# Patient Record
Sex: FEMALE | Race: AMERICAN INDIAN OR ALASKA NATIVE
[De-identification: names, ages, dates, MRNs, and addresses within clinical notes are randomized per-mention and may not be internally consistent; named-entity substitution may affect disease eponyms.]

---

## 2017-04-27 ENCOUNTER — HOSPITAL ENCOUNTER (EMERGENCY)
Dept: HOSPITAL 5 - ED | Age: 37
LOS: 1 days | Discharge: HOME | End: 2017-04-28
Payer: MEDICAID

## 2017-04-27 DIAGNOSIS — I10: ICD-10-CM

## 2017-04-27 DIAGNOSIS — J98.01: Primary | ICD-10-CM

## 2017-04-27 LAB
ANION GAP SERPL CALC-SCNC: 16 MMOL/L
BASOPHILS NFR BLD AUTO: 0.4 % (ref 0–1.8)
BUN SERPL-MCNC: 11 MG/DL (ref 7–17)
BUN/CREAT SERPL: 12.22 %
CALCIUM SERPL-MCNC: 8.7 MG/DL (ref 8.4–10.2)
CHLORIDE SERPL-SCNC: 103 MMOL/L (ref 98–107)
CK SERPL-CCNC: 61 UNITS/L (ref 30–135)
CO2 SERPL-SCNC: 25 MMOL/L (ref 22–30)
EOSINOPHIL NFR BLD AUTO: 1.7 % (ref 0–4.3)
GLUCOSE SERPL-MCNC: 102 MG/DL (ref 65–100)
HCT VFR BLD CALC: 30.4 % (ref 30.3–42.9)
HGB BLD-MCNC: 9.5 GM/DL (ref 10.1–14.3)
MCH RBC QN AUTO: 22 PG (ref 28–32)
MCHC RBC AUTO-ENTMCNC: 31 % (ref 30–34)
MCV RBC AUTO: 71 FL (ref 79–97)
PLATELET # BLD: 247 K/MM3 (ref 140–440)
POTASSIUM SERPL-SCNC: 4.1 MMOL/L (ref 3.6–5)
RBC # BLD AUTO: 4.26 M/MM3 (ref 3.65–5.03)
SODIUM SERPL-SCNC: 140 MMOL/L (ref 137–145)
WBC # BLD AUTO: 9.1 K/MM3 (ref 4.5–11)

## 2017-04-27 PROCEDURE — 93010 ELECTROCARDIOGRAM REPORT: CPT

## 2017-04-27 PROCEDURE — 85379 FIBRIN DEGRADATION QUANT: CPT

## 2017-04-27 PROCEDURE — 85025 COMPLETE CBC W/AUTO DIFF WBC: CPT

## 2017-04-27 PROCEDURE — 96374 THER/PROPH/DIAG INJ IV PUSH: CPT

## 2017-04-27 PROCEDURE — 99285 EMERGENCY DEPT VISIT HI MDM: CPT

## 2017-04-27 PROCEDURE — 80048 BASIC METABOLIC PNL TOTAL CA: CPT

## 2017-04-27 PROCEDURE — 96375 TX/PRO/DX INJ NEW DRUG ADDON: CPT

## 2017-04-27 PROCEDURE — 94640 AIRWAY INHALATION TREATMENT: CPT

## 2017-04-27 PROCEDURE — 82550 ASSAY OF CK (CPK): CPT

## 2017-04-27 PROCEDURE — 84484 ASSAY OF TROPONIN QUANT: CPT

## 2017-04-27 PROCEDURE — 82553 CREATINE MB FRACTION: CPT

## 2017-04-27 PROCEDURE — 71010: CPT

## 2017-04-27 PROCEDURE — 36415 COLL VENOUS BLD VENIPUNCTURE: CPT

## 2017-04-27 PROCEDURE — 83880 ASSAY OF NATRIURETIC PEPTIDE: CPT

## 2017-04-27 PROCEDURE — 93005 ELECTROCARDIOGRAM TRACING: CPT

## 2017-04-27 NOTE — EMERGENCY DEPARTMENT REPORT
ED Chest Pain HPI





- General


Chief Complaint: Chest Pain


Stated Complaint: CHEST PAIN


Time Seen by Provider: 04/27/17 17:28


Source: patient, EMS


Mode of arrival: Stretcher


Limitations: No Limitations





- History of Present Illness


Initial Comments: 


This is a 36-year-old -American female presents to the emergency 

department from home by EMS with complaint of midsternal chest tightness and a 

cough that began about 1:30 PM.  Patient says she went to bed last night 

feeling fine and awoke with a slightly sore throat and then the rest the 

symptoms began in the afternoon.  She did not take anything for symptoms prior 

to presentation.  She denies any fever, nausea, vomiting, diaphoresis or any 

shortness of breath.  She has a history of hypertension for which she takes 

hydrochlorothiazide.  Her primary care doctor is a family physician in [place].  

No recent travel or sick contacts at home.





Severity scale (0 -10): 6





- Related Data


 Home Medications











 Medication  Instructions  Recorded  Confirmed  Last Taken


 


Hydrochlorothiazide [Hctz] 12.5 mg PO QDAY 04/27/17 04/27/17 04/27/17








 Previous Rx's











 Medication  Instructions  Recorded  Last Taken  Type


 


ALBUTEROL Inhaler [ProAir HFA 2 puff IH QID PRN #1 inhalation 04/27/17 Unknown 

Rx





Inhaler]    


 


predniSONE [Deltasone] 20 mg PO QDAY #5 tab 04/27/17 Unknown Rx











 Allergies











Allergy/AdvReac Type Severity Reaction Status Date / Time


 


No Known Allergies Allergy   Unverified 04/27/17 17:18














GRISEL score





- Grisel Score


Age > 65: (0) No


Aspirin use within the Past 7 Days: (0) No


3 or more CAD Risk Factors: (0) No


2 or more Angina events in past 24 hrs: (1) Yes


Known CAD with more than 50% Stenosis: (0) No


Elevated Cardiac Markers: (0) No


ST Deviation Greater than 0.5mm: (0) No


GRISEL Score: 1





ED Review of Systems


ROS: 


Stated complaint: CHEST PAIN


Other details as noted in HPI





Comment: All other systems reviewed and negative


Constitutional: denies: chills, fever


Eyes: denies: eye pain, eye discharge, vision change


ENT: denies: ear pain, dental pain


Respiratory: cough.  denies: wheezing


Cardiovascular: chest pain.  denies: edema


Gastrointestinal: denies: abdominal pain, nausea, diarrhea


Genitourinary: denies: urgency, dysuria, discharge


Musculoskeletal: denies: back pain, joint swelling, arthralgia


Skin: denies: rash, lesions


Neurological: denies: headache, weakness, paresthesias





ED Past Medical Hx





- Past Medical History


Previous Medical History?: Yes


Hx Hypertension: Yes





- Social History


Smoking Status: Never Smoker


Substance Use Type: None





- Medications


Home Medications: 


 Home Medications











 Medication  Instructions  Recorded  Confirmed  Last Taken  Type


 


ALBUTEROL Inhaler [ProAir HFA 2 puff IH QID PRN #1 inhalation 04/27/17  Unknown 

Rx





Inhaler]     


 


Hydrochlorothiazide [Hctz] 12.5 mg PO QDAY 04/27/17 04/27/17 04/27/17 History


 


predniSONE [Deltasone] 20 mg PO QDAY #5 tab 04/27/17  Unknown Rx














ED Physical Exam





- General


Limitations: No Limitations





- Other


Other exam information: 


GENERAL: The patient is well-developed well-nourished.


HEENT: Normocephalic.  Atraumatic.  Extraocular motions are intact.  Patient 

has moist mucous membranes.  Pupils equal reactive to light bilaterally.


NECK: Supple.  Trachea is midline.


CHEST/LUNGS: Mild expiratory wheezing.  No tachypnea or accessory muscle use.  

There is no respiratory distress noted.


HEART/CARDIOVASCULAR: Regular.  There is no tachycardia.  There is no gallop 

rub or murmur.


ABDOMEN: Abdomen is soft, nontender.  Patient has normal bowel sounds.  

Morbidly obese habitus.


SKIN: Skin is warm and dry.


NEURO: The patient is awake, alert, and oriented.  The patient is cooperative.  

The patient has no focal neurologic deficits.  The patient has normal speech.


MUSCULOSKELETAL: There is no tenderness or deformity.  There is no limitation 

range of motion.  There is no evidence of acute injury.








ED Course


 Vital Signs











  04/27/17 04/27/17 04/27/17





  16:49 18:26 18:28


 


Temperature 98.6 F  


 


Pulse Rate 67 64 62


 


Respiratory 18 17 15





Rate   


 


Blood Pressure 179/63 179/63 179/63


 


Blood Pressure   





[Left]   


 


O2 Sat by Pulse 100 98 98





Oximetry   














  04/27/17 04/27/17 04/27/17





  18:30 18:32 18:34


 


Temperature   


 


Pulse Rate 61 66 61


 


Respiratory 18 15 12





Rate   


 


Blood Pressure 179/63 179/63 179/63


 


Blood Pressure   





[Left]   


 


O2 Sat by Pulse 99 99 100





Oximetry   














  04/27/17 04/27/17 04/27/17





  18:36 18:38 18:40


 


Temperature   


 


Pulse Rate 61 64 59 L


 


Respiratory 18 13 18





Rate   


 


Blood Pressure 179/63 179/63 179/63


 


Blood Pressure   





[Left]   


 


O2 Sat by Pulse 100 96 98





Oximetry   














  04/27/17 04/27/17 04/27/17





  18:42 18:44 18:46


 


Temperature   


 


Pulse Rate 66 67 61


 


Respiratory 19 12 21





Rate   


 


Blood Pressure 179/63 179/63 179/63


 


Blood Pressure   





[Left]   


 


O2 Sat by Pulse 99  100





Oximetry   














  04/27/17 04/27/17 04/27/17





  18:48 18:50 18:52


 


Temperature   


 


Pulse Rate 57 L 66 66


 


Respiratory 16 21 16





Rate   


 


Blood Pressure 179/63 179/63 179/63


 


Blood Pressure   





[Left]   


 


O2 Sat by Pulse 100 100 99





Oximetry   














  04/27/17 04/27/17 04/27/17





  18:54 18:56 18:58


 


Temperature   99.1 F


 


Pulse Rate 69 65 70


 


Respiratory 17 18 18





Rate   


 


Blood Pressure 172/71 172/71 172/71


 


Blood Pressure   172/71





[Left]   


 


O2 Sat by Pulse 100 99 98





Oximetry   














  04/27/17 04/27/17 04/27/17





  19:00 19:02 19:04


 


Temperature   


 


Pulse Rate 76 65 67


 


Respiratory 21 19 12





Rate   


 


Blood Pressure 172/71 159/75 159/75


 


Blood Pressure   





[Left]   


 


O2 Sat by Pulse 100 100 98





Oximetry   














  04/27/17 04/27/17 04/27/17





  19:06 19:08 19:10


 


Temperature   


 


Pulse Rate 66 71 70


 


Respiratory 16 17 15





Rate   


 


Blood Pressure 159/75 159/75 159/75


 


Blood Pressure   





[Left]   


 


O2 Sat by Pulse 98 99 99





Oximetry   














  04/27/17 04/27/17 04/27/17





  19:12 19:14 19:16


 


Temperature   


 


Pulse Rate 69 66 67


 


Respiratory 17 22 19





Rate   


 


Blood Pressure 159/75 159/75 159/75


 


Blood Pressure   





[Left]   


 


O2 Sat by Pulse 98 100 100





Oximetry   














  04/27/17 04/27/17 04/27/17





  19:18 19:20 19:22


 


Temperature   


 


Pulse Rate 68 66 68


 


Respiratory 21 25 H 14





Rate   


 


Blood Pressure 159/75 159/75 159/75


 


Blood Pressure   





[Left]   


 


O2 Sat by Pulse 100 100 100





Oximetry   














  04/27/17 04/27/17 04/27/17





  19:25 19:27 19:29


 


Temperature   


 


Pulse Rate 66 70 69


 


Respiratory 11 L 15 19





Rate   


 


Blood Pressure 179/63 179/63 179/63


 


Blood Pressure   





[Left]   


 


O2 Sat by Pulse 98 100 99





Oximetry   














  04/27/17 04/27/17 04/27/17





  19:30 19:33 19:35


 


Temperature   


 


Pulse Rate 68 65 71


 


Respiratory 14 13 13





Rate   


 


Blood Pressure 149/92 149/92 159/75


 


Blood Pressure   





[Left]   


 


O2 Sat by Pulse 100 100 100





Oximetry   














  04/27/17 04/27/17 04/27/17





  19:37 19:39 19:56


 


Temperature   


 


Pulse Rate 64 67 66


 


Respiratory 11 L 22 





Rate   


 


Blood Pressure 159/75 159/75 149/92


 


Blood Pressure   





[Left]   


 


O2 Sat by Pulse 100 99 





Oximetry   














  04/27/17 04/27/17





  20:01 20:41


 


Temperature  


 


Pulse Rate 83 


 


Respiratory 24 18





Rate  


 


Blood Pressure 124/82 


 


Blood Pressure  





[Left]  


 


O2 Sat by Pulse 96 





Oximetry  














ED Medical Decision Making





- Lab Data


Result diagrams: 


 04/27/17 17:30





 04/27/17 17:30





- EKG Data


-: EKG Interpreted by Me


EKG shows normal: sinus rhythm, axis (LAD), intervals, QRS complexes (LVH), ST-

T waves


Rate: normal





- EKG Data


When compared to previous EKG there are: previous EKG unavailable


Interpretation: LVH





- Radiology Data


Radiology results: image reviewed


interpreted by me: 


Chest x-ray did not show any acute process.  Heart is normal shape and size.  

No effusions.  No pneumothorax.  No signs of pneumonia seen.








- Medical Decision Making


36-year-old female presents to the emergency department with complaint of some 

nonspecific midsternal chest tightness.  Her only risk factor is hypertension.  

EKG does not show any signs of ST elevation MI or dysrhythmia.  Patient's labs 

include negative troponins 2, negative d-dimer.  There is no leukocytosis, 

electrolyte abnormalities, renal insufficiency.  Patient was given a DuoNeb and 

says that that really helped with her symptoms.  She required another dose of 

albuterol before she started feeling much better.  Her vital signs are stable 

including being afebrile.  Chest x-ray does not show any pneumonia, pleural 

effusion, pneumothorax or any acute process.  Patient has a GRISEL score of one 

of her discomfort is considered angina and 0 if not.  She is very low on the 

heart score.  For all these reasons patient appears safe for discharge home.  

She'll go home with an albuterol inhaler, a few days of steroids and 

encouragement to follow-up with her primary care doctor.  She will return to 

the ER with any worsening of her symptoms or any acute distress.








- Differential Diagnosis


MI, PE, pneumonia, asthma, bronchitis


Critical Care Time: No


Critical care attestation.: 


If time is entered above; I have spent that time in minutes in the direct care 

of this critically ill patient, excluding procedure time.








ED Disposition


Clinical Impression: 


 Chest tightness, Bronchospasm





Hypertension


Qualifiers:


 Hypertension type: essential hypertension Qualified Code(s): I10 - Essential (

primary) hypertension





Disposition: DISCHARGED TO HOME OR SELFCARE


Is pt being admited?: No


Condition: Stable


Instructions:  Chest Pain (ED), Hypertension (ED), Bronchospasm (ED)


Additional Instructions: 


Please follow-up with a primary care doctor in the next few days.  Return to 

the emergency department with any worsening of your symptoms or any acute 

distress.  Please continue taking your hydrochlorothiazide.  Try to stay away 

from foods that are high in salt and caffeinated products to help with her 

blood pressure.


Prescriptions: 


ALBUTEROL Inhaler [ProAir HFA Inhaler] 2 puff IH QID PRN #1 inhalation


 PRN Reason: Shortness Of Breath


predniSONE [Deltasone] 20 mg PO QDAY #5 tab


Referrals: 


PRIMARY CARE,MD [Primary Care Provider] - 3-5 Days


Time of Disposition: 23:23

## 2017-04-28 VITALS — DIASTOLIC BLOOD PRESSURE: 72 MMHG | SYSTOLIC BLOOD PRESSURE: 172 MMHG

## 2017-04-28 NOTE — XRAY REPORT
PORTABLE CHEST:

An AP portable view of the chest demonstrates a normal cardiac

contour considering the limits of this technique.  The lungs are clear 

with no evidence of infiltrate, fluid or failure.



IMPRESSION:

Normal portable chest.

## 2018-02-08 ENCOUNTER — HOSPITAL ENCOUNTER (EMERGENCY)
Dept: HOSPITAL 5 - ED | Age: 38
Discharge: HOME | End: 2018-02-08
Payer: MEDICAID

## 2018-02-08 VITALS — SYSTOLIC BLOOD PRESSURE: 133 MMHG | DIASTOLIC BLOOD PRESSURE: 82 MMHG

## 2018-02-08 DIAGNOSIS — Z3A.01: ICD-10-CM

## 2018-02-08 DIAGNOSIS — O20.9: Primary | ICD-10-CM

## 2018-02-08 DIAGNOSIS — K64.8: ICD-10-CM

## 2018-02-08 LAB
BILIRUB UR QL STRIP: (no result)
BLOOD UR QL VISUAL: (no result)
MUCOUS THREADS #/AREA URNS HPF: (no result) /HPF
NITRITE UR QL STRIP: (no result)
PH UR STRIP: 5 [PH] (ref 5–7)
PROT UR STRIP-MCNC: (no result) MG/DL
RBC #/AREA URNS HPF: 1 /HPF (ref 0–6)
UROBILINOGEN UR-MCNC: < 2 MG/DL (ref ?–2)
WBC #/AREA URNS HPF: 2 /HPF (ref 0–6)

## 2018-02-08 PROCEDURE — 36415 COLL VENOUS BLD VENIPUNCTURE: CPT

## 2018-02-08 PROCEDURE — 81001 URINALYSIS AUTO W/SCOPE: CPT

## 2018-02-08 PROCEDURE — 76817 TRANSVAGINAL US OBSTETRIC: CPT

## 2018-02-08 PROCEDURE — 76801 OB US < 14 WKS SINGLE FETUS: CPT

## 2018-02-08 PROCEDURE — 86901 BLOOD TYPING SEROLOGIC RH(D): CPT

## 2018-02-08 PROCEDURE — 86900 BLOOD TYPING SEROLOGIC ABO: CPT

## 2018-02-08 PROCEDURE — 84702 CHORIONIC GONADOTROPIN TEST: CPT

## 2018-02-08 NOTE — EMERGENCY DEPARTMENT REPORT
Blank Doc





- Documentation


Documentation: 





Patient is a 37 [place] female who is presenting with lower abdominal cramping 

as well as vaginal bleeding.  Patient is 6 weeks pregnant.  Patient has had 

some spotting off and on during the pregnancy however 2 days ago she on 

ultrasound did have a IUP with heartbeat.  Patient states that the bleeding 

today was a little heavier but only when she wiped after urinating for her 

cramping is new.  The patient is worried that she may have had a miscarriage.


Patient will have ultrasound urinalysis and beta Quant performed here

## 2018-02-08 NOTE — ULTRASOUND REPORT
FINAL REPORT



EXAM:  US OB TRANSVAGINAL



HISTORY:  vag bleed 



TECHNIQUE:  Ultrasound obstetrical transvaginal 



PRIORS:  None.



FINDINGS:  

There is gestational sac present within the uterus. There is

fetal pole with crown-rump length of 0.6 centimeters

corresponding to estimated gestational age of 6 weeks 3 days.

Estimated date of delivery is October 1, 2018. Fetal cardiac

activity is present with heart rate of 133 beats per minute. 



The left ovary is not identified 



There is a right ovarian cyst likely corpus luteum measuring 1.9

centimeters. 



IMPRESSION:  

Single live intrauterine gestation estimated at 6 weeks 3 days 



Right ovarian cyst noted 



Left ovary not identified sonographically

## 2018-02-08 NOTE — EMERGENCY DEPARTMENT REPORT
ED Female  HPI





- General


Chief complaint: Vaginal Bleeding


Stated complaint: VAGINAL BLEED


Time Seen by Provider: 18 17:19


Source: patient


Mode of arrival: Ambulatory


Limitations: No Limitations





- History of Present Illness


Initial comments: 





This is a 37 y.o. female presents with vaginal bleeding with every wipe. She 

reports being 6 weeks pregnant.  She is concerned today because the bleeding is 

heavier than before. She is wearing a pad and have not noticed bleeding on pad. 

She is followed by OB/GYN and informed of constipation on US to confirm 

pregnancy. She was placed on macrobid last month for a UTI and concerned it didn

't resolve or possibly a miscarriage.





MD Complaint: vaginal bleeding


-: days(s) (2)


Location: perineum


Severity: moderate


Severity scale (0 -10): 4


Improves with: none


Worsens with: none


Associated Symptoms: vaginal bleeding





- Related Data


Sexually active: Yes


: 4


Para: 3


A: 0


 Home Medications











 Medication  Instructions  Recorded  Confirmed  Last Taken


 


Hydrochlorothiazide [Hctz] 12.5 mg PO QDAY 17








 Previous Rx's











 Medication  Instructions  Recorded  Last Taken  Type


 


ALBUTEROL Inhaler [ProAir HFA 2 puff IH QID PRN #1 inhalation 17 Unknown 

Rx





Inhaler]    


 


predniSONE [Deltasone] 20 mg PO QDAY #5 tab 17 Unknown Rx











 Allergies











Allergy/AdvReac Type Severity Reaction Status Date / Time


 


No Known Allergies Allergy   Verified 18 14:53














ED Review of Systems


ROS: 


Stated complaint: VAGINAL BLEED


Other details as noted in HPI





Constitutional: denies: chills, fever


Respiratory: denies: cough, shortness of breath, wheezing


Cardiovascular: denies: chest pain, palpitations


Gastrointestinal: constipation.  denies: abdominal pain, nausea, vomiting, 

diarrhea, hematemesis, melena, hematochezia


Genitourinary: discharge (bloody )


Musculoskeletal: denies: back pain, joint swelling, arthralgia


Neurological: denies: headache, weakness, paresthesias





ED Past Medical Hx





- Past Medical History


Hx Hypertension: Yes





- Surgical History


Past Surgical History?: No


Hx Cholecystectomy: Yes





- Social History


Smoking Status: Never Smoker


Substance Use Type: None





- Medications


Home Medications: 


 Home Medications











 Medication  Instructions  Recorded  Confirmed  Last Taken  Type


 


ALBUTEROL Inhaler [ProAir HFA 2 puff IH QID PRN #1 inhalation 17  Unknown 

Rx





Inhaler]     


 


Hydrochlorothiazide [Hctz] 12.5 mg PO QDAY 17 History


 


predniSONE [Deltasone] 20 mg PO QDAY #5 tab 17  Unknown Rx














ED Physical Exam





- General


Limitations: No Limitations


General appearance: alert, in no apparent distress





- Respiratory


Respiratory exam: Present: normal lung sounds bilaterally.  Absent: respiratory 

distress





- Cardiovascular


Cardiovascular Exam: Present: regular rate, normal rhythm.  Absent: systolic 

murmur, diastolic murmur, rubs, gallop





- GI/Abdominal


GI/Abdominal exam: Present: soft, normal bowel sounds





- Rectal


Rectal exam: Present: hemorrhoids (external hemorrhoids and internal bleeding 

hemorrhoids)





- Neurological Exam


Neurological exam: Present: alert, oriented X3, normal gait





ED Course


 Vital Signs











  18





  14:53 14:58


 


Temperature 98.2 F 


 


Pulse Rate 80 


 


Respiratory 16 





Rate  


 


Blood Pressure  133/82





[Left]  


 


O2 Sat by Pulse 100 





Oximetry  














ED Medical Decision Making





- Radiology Data


Radiology results: image reviewed





Transvaginal US: Single live intrauterine gestation estimated at 6 weeks 3 days 





Right ovarian cyst noted 





Left ovary not identified sonographically 








Fetal US: IMPRESSION: 


Single live intrauterine gestation estimated at 6 weeks 3 days 





Right ovarian cyst noted 





- Medical Decision Making





This is a 37 y.o. Female presents with vaginal discharge for 2 days. She is 

pregnant. A0. Established OB patient at [facility], but she doesn't have a 

specific physician there. Patient is stable and in no acute distress. Obtained 

UA, which is normal. Transvaginal and fetal US obtained and read by the 

radiologist.  She is 6 weeks and 3 days gestation, right ovarian cyst, and left 

ovary not identified. On physical assessment external and internal hemorrhoids 

with active bleeding. Patient discharged home and f/u with OB/GYN.


Critical care attestation.: 


If time is entered above; I have spent that time in minutes in the direct care 

of this critically ill patient, excluding procedure time.








ED Disposition


Clinical Impression: 


 Bleeding internal hemorrhoids, Hemorrhoids, external





Disposition: DC-01 TO HOME OR SELFCARE


Is pt being admited?: No


Does the pt Need Aspirin: No


Condition: Stable


Instructions:  Hemorrhoids (ED), Rectal Bleeding (ED)


Additional Instructions: 


Increase fluid intake and take stool softener daily.


Apply hydrocortisone cream to external hemorrhoids.


Follow up with OB/GYN.


Referrals: 


FIORELLA KNOX MD [Staff Physician] - 3-5 Days


Time of Disposition: 22:00


Print Language: ENGLISH

## 2018-02-08 NOTE — ULTRASOUND REPORT
FINAL REPORT



EXAM:  US OB &lt; = 14 WEEKS FETUS



HISTORY:  vag bleed 



TECHNIQUE:  Ultrasound obstetrical transabdominal 



PRIORS:  None.



FINDINGS:  

There is gestational sac present within the uterus. There is

fetal pole with crown-rump length of 0.6 centimeters

corresponding to estimated gestational age of 6 weeks 3 days.

Estimated date of delivery is October 1, 2018. Fetal cardiac

activity is present with heart rate of 133 beats per minute. 



The left ovary is not identified 



There is a right ovarian cyst likely corpus luteum measuring 1.9

centimeters. 



IMPRESSION:  

Single live intrauterine gestation estimated at 6 weeks 3 days 



Right ovarian cyst noted